# Patient Record
Sex: MALE | Race: WHITE | NOT HISPANIC OR LATINO | Employment: OTHER | URBAN - METROPOLITAN AREA
[De-identification: names, ages, dates, MRNs, and addresses within clinical notes are randomized per-mention and may not be internally consistent; named-entity substitution may affect disease eponyms.]

---

## 2022-10-31 ENCOUNTER — TELEPHONE (OUTPATIENT)
Dept: GASTROENTEROLOGY | Facility: CLINIC | Age: 62
End: 2022-10-31

## 2022-10-31 ENCOUNTER — OFFICE VISIT (OUTPATIENT)
Dept: GASTROENTEROLOGY | Facility: CLINIC | Age: 62
End: 2022-10-31

## 2022-10-31 VITALS
HEIGHT: 75 IN | WEIGHT: 249 LBS | BODY MASS INDEX: 30.96 KG/M2 | SYSTOLIC BLOOD PRESSURE: 148 MMHG | DIASTOLIC BLOOD PRESSURE: 84 MMHG

## 2022-10-31 DIAGNOSIS — Z86.010 HISTORY OF COLON POLYPS: Primary | ICD-10-CM

## 2022-10-31 DIAGNOSIS — Z80.0 FAMILY HISTORY- STOMACH CANCER: ICD-10-CM

## 2022-10-31 RX ORDER — DIPHENHYDRAMINE HYDROCHLORIDE 25 MG/1
25 CAPSULE ORAL DAILY PRN
COMMUNITY
Start: 2022-08-16

## 2022-10-31 RX ORDER — ATORVASTATIN CALCIUM 40 MG/1
40 TABLET, FILM COATED ORAL DAILY
COMMUNITY

## 2022-10-31 RX ORDER — IBUPROFEN 800 MG/1
800 TABLET ORAL
COMMUNITY

## 2022-10-31 RX ORDER — LEVOTHYROXINE SODIUM 112 UG/1
112 TABLET ORAL
COMMUNITY

## 2022-10-31 RX ORDER — ZOLPIDEM TARTRATE 10 MG/1
10 TABLET ORAL
COMMUNITY
Start: 2022-08-15

## 2022-10-31 RX ORDER — SITAGLIPTIN AND METFORMIN HYDROCHLORIDE 1000; 50 MG/1; MG/1
TABLET, FILM COATED, EXTENDED RELEASE ORAL EVERY 24 HOURS
COMMUNITY

## 2022-10-31 RX ORDER — CETIRIZINE HYDROCHLORIDE 10 MG/1
TABLET ORAL
COMMUNITY
Start: 2022-09-06

## 2022-10-31 RX ORDER — FENOFIBRATE 160 MG/1
145 TABLET ORAL
COMMUNITY

## 2022-10-31 RX ORDER — SILDENAFIL 100 MG/1
100 TABLET, FILM COATED ORAL DAILY PRN
COMMUNITY
Start: 2022-08-16

## 2022-10-31 RX ORDER — TESTOSTERONE GEL, 1% 10 MG/G
GEL TRANSDERMAL EVERY 12 HOURS
COMMUNITY

## 2022-10-31 RX ORDER — MONTELUKAST SODIUM 10 MG/1
TABLET ORAL
COMMUNITY
Start: 2022-09-06

## 2022-10-31 RX ORDER — CHOLECALCIFEROL (VITAMIN D3) 100000/G
POWDER (GRAM) MISCELLANEOUS EVERY 24 HOURS
COMMUNITY

## 2022-10-31 NOTE — LETTER
October 31, 2022     [place], [address] [facility] [place] [address] [place] [address]    Patient: Zahraa Leary   YOB: 1960   Date of Visit: 10/31/2022       Dear Dr Pramod Wang: Thank you for referring Ari Card to me for evaluation  Below are my notes for this consultation  If you have questions, please do not hesitate to call me  I look forward to following your patient along with you  Sincerely,        RED Figueroa        CC: No Recipients  RED Figueroa  10/31/2022  1:34 PM  Sign when Signing Visit    [address] [facility] - Outpatient Consultation  Zahraa Leary 60 y o  male MRN: [id number]  Encounter: [id number]    ASSESSMENT AND PLAN:      1  History of colon polyps  Colon polyps on prior colonoscopies  Most recent colonoscopy 06/2017 was normal, 5 year recall recommended  No recent change in bowel habits, no alarm symptoms  -scheduled for colonoscopy at [facility]    2  GERD  3  Family history of stomach cancer  Patient reports intermittent GERD symptoms usually with dietary indiscretion  No alarm symptoms  Reports family history of stomach cancer in his father  Prior surveillance EGD 2017 was negative for H pylori and Barretts  -scheduled for EGD at [facility]  -reviewed GERD diet and lifestyle modification  -recommend OTC famotidine as needed for GERD symptoms    Followup Appointment:  As needed  ______________________________________________________________________    Chief Complaint   Patient presents with   • family history of stomach cancer     Would like EGD       HPI:   Zahraa Leary is a 60y o  year old male who presents to schedule surveillance colonoscopy    History of colon polyps on prior colonoscopy, last colonoscopy  6/2017 was normal  He reports paternal uncles with colon cancer and also paternal cousins diagnosed with colon cancer in their 45s   No recent acute change in bowel habits and denies melena or rectal bleeding    He reports family history of stomach cancer in his father who was diagnosed at age 76 and has been undergoing routine EGDs also every 5 years  Prior EGD in 2017 was negative for H pylori and Barretts  He does experience occasional dysphagia due to history of cervical fusion/implant but denies retained food bolus or regurgitation  Intermittent reflux/GERD symptoms with dietary indiscretion, takes occasional Tums with relief  Denies nausea or vomiting, appetite is good and no unintentional weight loss           Historical Information   Past Medical History:   Diagnosis Date   • Colon polyp    • Diabetes mellitus ([address] )    • GERD (gastroesophageal reflux disease)    • Hyperlipidemia      Past Surgical History:   Procedure Laterality Date   • COLONOSCOPY     • UPPER GASTROINTESTINAL ENDOSCOPY       Social History     Substance and Sexual Activity   Alcohol Use Not Currently     Social History     Substance and Sexual Activity   Drug Use Not on file     Social History     Tobacco Use   Smoking Status Never Smoker   Smokeless Tobacco Never Used     Family History   Problem Relation Age of Onset   • Stomach cancer Father    • Prostate cancer Brother    • Stomach cancer Paternal Grandmother    • Colon cancer Paternal Uncle    • Colon cancer Cousin        Meds/Allergies     Current Outpatient Medications:   •  Albuterol Sulfate 108 (90 Base) MCG/ACT AEPB  •  atorvastatin (LIPITOR) 40 mg tablet  •  Banophen 25 MG capsule  •  bimatoprost (LUMIGAN) 0 01 % ophthalmic drops  •  cetirizine (ZyrTEC) 10 mg tablet  •  Cholecalciferol (Vitamin D3) 865015 UNIT/GM POWD  •  fenofibrate 160 MG tablet  •  ibuprofen (MOTRIN) 800 mg tablet  •  levothyroxine 112 mcg tablet  •  montelukast (SINGULAIR) 10 mg tablet  •  sildenafil (VIAGRA) 100 mg tablet  •  SITagliptin-metFORMIN HCl ER (Janumet XR)  MG TB24  •  testosterone (ANDROGEL) 1%  •  zolpidem (AMBIEN) 10 mg tablet    No Known Allergies    PHYSICAL EXAM: Blood pressure 148/84, height 6' 3" (1 905 m), weight 113 kg (249 lb)  Body mass index is 31 12 kg/m²  General Appearance: NAD, cooperative, alert  Eyes: Anicteric  ENT:  Normocephalic, atraumatic, normal mucosa  Neck:  Supple, symmetrical, trachea midline,   Resp:  Clear to auscultation bilaterally; no rales, rhonchi or wheezing; respirations unlabored   CV:  S1 S2, Regular rate and rhythm; no murmur, rub, or gallop  GI:  Soft, non-tender, non-distended; normal bowel sounds; no masses, no organomegaly   Rectal: Deferred  Musculoskeletal: No cyanosis, clubbing or edema  Normal ROM  Skin:  No jaundice, rashes, or lesions     Psych: Normal affect, good eye contact  Neuro: No gross deficits, AAOx3            REVIEW OF SYSTEMS:    CONSTITUTIONAL: Denies any fever, chills, rigors, and weight loss  HEENT: No earache or tinnitus  Denies hearing loss or visual disturbances  CARDIOVASCULAR: No chest pain or palpitations  RESPIRATORY: Denies any cough, hemoptysis, shortness of breath or dyspnea on exertion  GASTROINTESTINAL: As noted in the History of Present Illness  GENITOURINARY: No problems with urination  Denies any hematuria or dysuria  NEUROLOGIC: No dizziness or vertigo, denies headaches  MUSCULOSKELETAL: Denies any muscle or joint pain  SKIN: Denies skin rashes or itching  ENDOCRINE: Denies excessive thirst  Denies intolerance to heat or cold  PSYCHOSOCIAL: Denies depression or anxiety  Denies any recent memory loss

## 2022-10-31 NOTE — TELEPHONE ENCOUNTER
Scheduled date of colonoscopy (as of today): 11/21/22  Physician performing colonoscopy: Suzi Franco  Location of colonoscopy: [facility] ([facility])  Bowel prep reviewed with patient: Miralax/Dulcolax  Instructions reviewed with patient by: Francesco Stafford  Clearances: None

## 2022-10-31 NOTE — PROGRESS NOTES
[address] [facility] - Outpatient Consultation  Louise Vizcarra 60 y o  male MRN: [id number]  Encounter: [id number]    ASSESSMENT AND PLAN:      1  History of colon polyps  Colon polyps on prior colonoscopies  Most recent colonoscopy 06/2017 was normal, 5 year recall recommended  No recent change in bowel habits, no alarm symptoms  -scheduled for colonoscopy at [facility]    2  GERD  3  Family history of stomach cancer  Patient reports intermittent GERD symptoms usually with dietary indiscretion  No alarm symptoms  Reports family history of stomach cancer in his father  Prior surveillance EGD 2017 was negative for H pylori and Barretts  -scheduled for EGD at [facility]  -reviewed GERD diet and lifestyle modification  -recommend OTC famotidine as needed for GERD symptoms    Followup Appointment:  As needed  ______________________________________________________________________    Chief Complaint   Patient presents with   • family history of stomach cancer     Would like EGD       HPI:   Louise Vizcarra is a 60y o  year old male who presents to schedule surveillance colonoscopy  History of colon polyps on prior colonoscopy, last colonoscopy  6/2017 was normal  He reports paternal uncles with colon cancer and also paternal cousins diagnosed with colon cancer in their 45s   No recent acute change in bowel habits and denies melena or rectal bleeding    He reports family history of stomach cancer in his father who was diagnosed at age 76 and has been undergoing routine EGDs also every 5 years  Prior EGD in 2017 was negative for H pylori and Barretts  He does experience occasional dysphagia due to history of cervical fusion/implant but denies retained food bolus or regurgitation  Intermittent reflux/GERD symptoms with dietary indiscretion, takes occasional Tums with relief  Denies nausea or vomiting, appetite is good and no unintentional weight loss           Historical Information Past Medical History:   Diagnosis Date   • Colon polyp    • Diabetes mellitus ([facility] [address] )    • GERD (gastroesophageal reflux disease)    • Hyperlipidemia      Past Surgical History:   Procedure Laterality Date   • COLONOSCOPY     • UPPER GASTROINTESTINAL ENDOSCOPY       Social History     Substance and Sexual Activity   Alcohol Use Not Currently     Social History     Substance and Sexual Activity   Drug Use Not on file     Social History     Tobacco Use   Smoking Status Never Smoker   Smokeless Tobacco Never Used     Family History   Problem Relation Age of Onset   • Stomach cancer Father    • Prostate cancer Brother    • Stomach cancer Paternal Grandmother    • Colon cancer Paternal Uncle    • Colon cancer Cousin        Meds/Allergies     Current Outpatient Medications:   •  Albuterol Sulfate 108 (90 Base) MCG/ACT AEPB  •  atorvastatin (LIPITOR) 40 mg tablet  •  Banophen 25 MG capsule  •  bimatoprost (LUMIGAN) 0 01 % ophthalmic drops  •  cetirizine (ZyrTEC) 10 mg tablet  •  Cholecalciferol (Vitamin D3) 549797 UNIT/GM POWD  •  fenofibrate 160 MG tablet  •  ibuprofen (MOTRIN) 800 mg tablet  •  levothyroxine 112 mcg tablet  •  montelukast (SINGULAIR) 10 mg tablet  •  sildenafil (VIAGRA) 100 mg tablet  •  SITagliptin-metFORMIN HCl ER (Janumet XR)  MG TB24  •  testosterone (ANDROGEL) 1%  •  zolpidem (AMBIEN) 10 mg tablet    No Known Allergies    PHYSICAL EXAM:    Blood pressure 148/84, height 6' 3" (1 905 m), weight 113 kg (249 lb)  Body mass index is 31 12 kg/m²  General Appearance: NAD, cooperative, alert  Eyes: Anicteric  ENT:  Normocephalic, atraumatic, normal mucosa  Neck:  Supple, symmetrical, trachea midline,   Resp:  Clear to auscultation bilaterally; no rales, rhonchi or wheezing; respirations unlabored   CV:  S1 S2, Regular rate and rhythm; no murmur, rub, or gallop    GI:  Soft, non-tender, non-distended; normal bowel sounds; no masses, no organomegaly   Rectal: Deferred  Musculoskeletal: No cyanosis, clubbing or edema  Normal ROM  Skin:  No jaundice, rashes, or lesions     Psych: Normal affect, good eye contact  Neuro: No gross deficits, AAOx3            REVIEW OF SYSTEMS:    CONSTITUTIONAL: Denies any fever, chills, rigors, and weight loss  HEENT: No earache or tinnitus  Denies hearing loss or visual disturbances  CARDIOVASCULAR: No chest pain or palpitations  RESPIRATORY: Denies any cough, hemoptysis, shortness of breath or dyspnea on exertion  GASTROINTESTINAL: As noted in the History of Present Illness  GENITOURINARY: No problems with urination  Denies any hematuria or dysuria  NEUROLOGIC: No dizziness or vertigo, denies headaches  MUSCULOSKELETAL: Denies any muscle or joint pain  SKIN: Denies skin rashes or itching  ENDOCRINE: Denies excessive thirst  Denies intolerance to heat or cold  PSYCHOSOCIAL: Denies depression or anxiety  Denies any recent memory loss

## 2022-11-10 ENCOUNTER — TELEPHONE (OUTPATIENT)
Dept: GASTROENTEROLOGY | Facility: CLINIC | Age: 62
End: 2022-11-10

## 2022-11-10 NOTE — TELEPHONE ENCOUNTER
Patients GI provider:  Dr Raphael Thorpe    Number to return call: [phone]    Reason for call: Pt calling to reschedule his colon & EGD      Scheduled procedure/appointment date if applicable: Procedure: 72/54/7236

## 2022-12-21 ENCOUNTER — ANESTHESIA (OUTPATIENT)
Dept: GASTROENTEROLOGY | Facility: AMBULATORY SURGERY CENTER | Age: 62
End: 2022-12-21

## 2022-12-21 ENCOUNTER — HOSPITAL ENCOUNTER (OUTPATIENT)
Dept: GASTROENTEROLOGY | Facility: AMBULATORY SURGERY CENTER | Age: 62
Discharge: HOME | End: 2022-12-21

## 2022-12-21 ENCOUNTER — ANESTHESIA EVENT (OUTPATIENT)
Dept: GASTROENTEROLOGY | Facility: AMBULATORY SURGERY CENTER | Age: 62
End: 2022-12-21

## 2022-12-21 VITALS
HEART RATE: 74 BPM | WEIGHT: 238 LBS | SYSTOLIC BLOOD PRESSURE: 130 MMHG | DIASTOLIC BLOOD PRESSURE: 84 MMHG | TEMPERATURE: 96.9 F | BODY MASS INDEX: 29.59 KG/M2 | RESPIRATION RATE: 16 BRPM | OXYGEN SATURATION: 94 % | HEIGHT: 75 IN

## 2022-12-21 DIAGNOSIS — Z80.0 FAMILY HISTORY- STOMACH CANCER: ICD-10-CM

## 2022-12-21 DIAGNOSIS — Z86.010 HISTORY OF COLON POLYPS: ICD-10-CM

## 2022-12-21 RX ORDER — LIDOCAINE HYDROCHLORIDE 10 MG/ML
INJECTION, SOLUTION EPIDURAL; INFILTRATION; INTRACAUDAL; PERINEURAL AS NEEDED
Status: DISCONTINUED | OUTPATIENT
Start: 2022-12-21 | End: 2022-12-21

## 2022-12-21 RX ORDER — PROPOFOL 10 MG/ML
INJECTION, EMULSION INTRAVENOUS AS NEEDED
Status: DISCONTINUED | OUTPATIENT
Start: 2022-12-21 | End: 2022-12-21

## 2022-12-21 RX ORDER — ACETAMINOPHEN 500 MG
500 TABLET ORAL EVERY 6 HOURS PRN
COMMUNITY

## 2022-12-21 RX ORDER — SODIUM CHLORIDE, SODIUM LACTATE, POTASSIUM CHLORIDE, CALCIUM CHLORIDE 600; 310; 30; 20 MG/100ML; MG/100ML; MG/100ML; MG/100ML
50 INJECTION, SOLUTION INTRAVENOUS CONTINUOUS
Status: DISCONTINUED | OUTPATIENT
Start: 2022-12-21 | End: 2022-12-25 | Stop reason: HOSPADM

## 2022-12-21 RX ADMIN — PROPOFOL 50 MG: 10 INJECTION, EMULSION INTRAVENOUS at 11:28

## 2022-12-21 RX ADMIN — PROPOFOL 30 MG: 10 INJECTION, EMULSION INTRAVENOUS at 11:43

## 2022-12-21 RX ADMIN — LIDOCAINE HYDROCHLORIDE 40 MG: 10 INJECTION, SOLUTION EPIDURAL; INFILTRATION; INTRACAUDAL; PERINEURAL at 11:19

## 2022-12-21 RX ADMIN — SODIUM CHLORIDE, SODIUM LACTATE, POTASSIUM CHLORIDE, CALCIUM CHLORIDE 50 ML/HR: 600; 310; 30; 20 INJECTION, SOLUTION INTRAVENOUS at 11:00

## 2022-12-21 RX ADMIN — PROPOFOL 50 MG: 10 INJECTION, EMULSION INTRAVENOUS at 11:25

## 2022-12-21 RX ADMIN — PROPOFOL 50 MG: 10 INJECTION, EMULSION INTRAVENOUS at 11:23

## 2022-12-21 NOTE — H&P
History and Physical - [facility]  Danyel Reyes 60 y o  male MRN: [id number]    HPI: Danyel Reyes is a 60y o  year old male who presents for EGD and colonoscopy secondary to GERD and a family history of gastric cancer as well as a personal history of colon polyps    REVIEW OF SYSTEMS: Per the HPI, and otherwise unremarkable      Historical Information   Past Medical History:   Diagnosis Date   • Arthritis    • Asthma    • Colon polyp    • CPAP (continuous positive airway pressure) dependence    • Diabetes mellitus (HCC)    • GERD (gastroesophageal reflux disease)    • Hyperlipidemia    • Kidney stone    • RIVERA (nonalcoholic steatohepatitis)    • Sleep apnea      Past Surgical History:   Procedure Laterality Date   • BACK SURGERY      spinal fusion s1-L2   • COLONOSCOPY     • CYST REMOVAL      chin   • HAND SURGERY Right     I&D   • NECK SURGERY     • SHOULDER SURGERY Bilateral     shoulder reconstruction   • UPPER GASTROINTESTINAL ENDOSCOPY     • WRIST SURGERY Left     fracture repair     Social History   Social History     Substance and Sexual Activity   Alcohol Use Not Currently     Social History     Substance and Sexual Activity   Drug Use Never     Social History     Tobacco Use   Smoking Status Never   Smokeless Tobacco Never     Family History   Problem Relation Age of Onset   • Stomach cancer Father    • Prostate cancer Brother    • Stomach cancer Paternal Grandmother    • Colon cancer Paternal Uncle    • Colon cancer Cousin        Meds/Allergies       Current Outpatient Medications:   •  acetaminophen (TYLENOL) 500 mg tablet  •  atorvastatin (LIPITOR) 40 mg tablet  •  Banophen 25 MG capsule  •  bimatoprost (LUMIGAN) 0 01 % ophthalmic drops  •  cetirizine (ZyrTEC) 10 mg tablet  •  Cholecalciferol (Vitamin D3) 797377 UNIT/GM POWD  •  fenofibrate 160 MG tablet  •  ibuprofen (MOTRIN) 800 mg tablet  •  levothyroxine 112 mcg tablet  •  SITagliptin-metFORMIN HCl ER (Janumet XR)  MG TB24  •  testosterone (ANDROGEL) 1%  •  zolpidem (AMBIEN) 10 mg tablet  •  Albuterol Sulfate 108 (90 Base) MCG/ACT AEPB  •  montelukast (SINGULAIR) 10 mg tablet  •  sildenafil (VIAGRA) 100 mg tablet    Current Facility-Administered Medications:   •  lactated ringers infusion, 50 mL/hr, Intravenous, Continuous, 50 mL/hr at 12/21/22 1100    Allergies   Allergen Reactions   • Other Rash     Allergic to dust, grass, pollen , dogs cats       Objective     /76   Pulse 82   Temp (!) 96 9 °F (36 1 °C) (Temporal)   Resp 21   Ht 6' 3" (1 905 m)   Wt 108 kg (238 lb)   SpO2 95%   BMI 29 75 kg/m²     PHYSICAL EXAM    Gen: NAD AAOx3  Head: Normocephalic, Atraumatic  CV: S1S2 RRR no m/r/g  CHEST: Clear b/l no c/r/w  ABD: soft, +BS NT/ND  EXT: no edema    ASSESSMENT/PLAN:  This is a 60y o  year old male here for EGD and colonoscopy secondary to GERD and a family history of gastric cancer as well as a personal history of colon polyps, and he is stable and optimized for his procedure

## 2022-12-21 NOTE — ANESTHESIA POSTPROCEDURE EVALUATION
Post-Op Assessment Note    CV Status:  Stable  Pain Score: 0    Pain management: adequate     Mental Status:  Alert and awake   Hydration Status:  Euvolemic   PONV Controlled:  Controlled   Airway Patency:  Patent      Post Op Vitals Reviewed: Yes      Staff: Anesthesiologist, with CRNAs         No notable events documented      BP      Temp     Pulse     Resp      SpO2

## 2022-12-21 NOTE — ANESTHESIA PREPROCEDURE EVALUATION
Procedure:  COLONOSCOPY  EGD    Relevant Problems   CARDIO   (+) Hyperlipidemia      GI/HEPATIC   (+) GERD (gastroesophageal reflux disease)      PULMONARY   (+) Sleep apnea      Endocrine   (+) Diabetes mellitus (HCC)      Other   (+) CPAP (continuous positive airway pressure) dependence        Physical Exam    Airway    Mallampati score: II  TM Distance: >3 FB  Neck ROM: full     Dental   No notable dental hx     Cardiovascular      Pulmonary      Other Findings        Anesthesia Plan  ASA Score- 3     Anesthesia Type- IV sedation with anesthesia with ASA Monitors  Additional Monitors:   Airway Plan:           Plan Factors-Exercise tolerance (METS): >4 METS  Chart reviewed  Patient summary reviewed  Patient is not a current smoker  Induction- intravenous  Postoperative Plan-     Informed Consent- Anesthetic plan and risks discussed with patient  I personally reviewed this patient with the CRNA  Discussed and agreed on the Anesthesia Plan with the CRNA  Sigrid Pascual